# Patient Record
Sex: MALE | Race: WHITE | Employment: FULL TIME | URBAN - METROPOLITAN AREA
[De-identification: names, ages, dates, MRNs, and addresses within clinical notes are randomized per-mention and may not be internally consistent; named-entity substitution may affect disease eponyms.]

---

## 2017-01-04 ENCOUNTER — OFFICE VISIT (OUTPATIENT)
Dept: FAMILY MEDICINE CLINIC | Age: 27
End: 2017-01-04

## 2017-01-04 VITALS
HEART RATE: 92 BPM | TEMPERATURE: 98 F | HEIGHT: 75 IN | WEIGHT: 315 LBS | SYSTOLIC BLOOD PRESSURE: 120 MMHG | RESPIRATION RATE: 16 BRPM | BODY MASS INDEX: 39.17 KG/M2 | DIASTOLIC BLOOD PRESSURE: 70 MMHG

## 2017-01-04 DIAGNOSIS — S61.412D: ICD-10-CM

## 2017-01-04 DIAGNOSIS — J01.00 ACUTE NON-RECURRENT MAXILLARY SINUSITIS: Primary | ICD-10-CM

## 2017-01-04 PROCEDURE — 99213 OFFICE O/P EST LOW 20 MIN: CPT | Performed by: FAMILY MEDICINE

## 2017-01-04 RX ORDER — CEPHALEXIN 500 MG/1
500 CAPSULE ORAL 3 TIMES DAILY
Qty: 21 CAPSULE | Refills: 0 | Status: SHIPPED | OUTPATIENT
Start: 2017-01-04 | End: 2017-01-11

## 2017-01-04 ASSESSMENT — ENCOUNTER SYMPTOMS
COUGH: 0
NAUSEA: 0
ABDOMINAL PAIN: 0
SINUS PRESSURE: 1
SHORTNESS OF BREATH: 0
EYES NEGATIVE: 1
VOMITING: 1
SORE THROAT: 1
SINUS PAIN: 1
CONSTIPATION: 0
SWOLLEN GLANDS: 0
RHINORRHEA: 1
WHEEZING: 0
DIARRHEA: 0

## 2017-01-04 ASSESSMENT — PATIENT HEALTH QUESTIONNAIRE - PHQ9
SUM OF ALL RESPONSES TO PHQ QUESTIONS 1-9: 0
1. LITTLE INTEREST OR PLEASURE IN DOING THINGS: 0
SUM OF ALL RESPONSES TO PHQ9 QUESTIONS 1 & 2: 0
2. FEELING DOWN, DEPRESSED OR HOPELESS: 0

## 2017-01-06 ENCOUNTER — OFFICE VISIT (OUTPATIENT)
Dept: FAMILY MEDICINE CLINIC | Age: 27
End: 2017-01-06

## 2017-01-06 VITALS
RESPIRATION RATE: 20 BRPM | WEIGHT: 315 LBS | BODY MASS INDEX: 39.17 KG/M2 | HEIGHT: 75 IN | HEART RATE: 76 BPM | SYSTOLIC BLOOD PRESSURE: 136 MMHG | DIASTOLIC BLOOD PRESSURE: 88 MMHG | TEMPERATURE: 96.7 F

## 2017-01-06 DIAGNOSIS — S61.012S THUMB LACERATION, LEFT, SEQUELA: Primary | ICD-10-CM

## 2017-01-06 PROCEDURE — 99213 OFFICE O/P EST LOW 20 MIN: CPT | Performed by: FAMILY MEDICINE

## 2017-01-06 ASSESSMENT — ENCOUNTER SYMPTOMS
SHORTNESS OF BREATH: 0
DIARRHEA: 0
CONSTIPATION: 0
ABDOMINAL PAIN: 0
NAUSEA: 0
COUGH: 0
EYES NEGATIVE: 1

## 2018-09-10 ENCOUNTER — OFFICE VISIT (OUTPATIENT)
Dept: FAMILY MEDICINE CLINIC | Age: 28
End: 2018-09-10
Payer: COMMERCIAL

## 2018-09-10 VITALS
BODY MASS INDEX: 39.17 KG/M2 | DIASTOLIC BLOOD PRESSURE: 88 MMHG | WEIGHT: 315 LBS | TEMPERATURE: 96.9 F | SYSTOLIC BLOOD PRESSURE: 126 MMHG | RESPIRATION RATE: 12 BRPM | HEART RATE: 92 BPM | HEIGHT: 75 IN

## 2018-09-10 DIAGNOSIS — H92.02 LEFT EAR PAIN: Primary | ICD-10-CM

## 2018-09-10 DIAGNOSIS — H66.92 LEFT OTITIS MEDIA, UNSPECIFIED OTITIS MEDIA TYPE: ICD-10-CM

## 2018-09-10 PROCEDURE — 99213 OFFICE O/P EST LOW 20 MIN: CPT | Performed by: FAMILY MEDICINE

## 2018-09-10 RX ORDER — LEVOFLOXACIN 500 MG/1
500 TABLET, FILM COATED ORAL DAILY
Qty: 7 TABLET | Refills: 0 | Status: SHIPPED | OUTPATIENT
Start: 2018-09-10 | End: 2018-09-17

## 2018-09-10 RX ORDER — NAPROXEN 500 MG/1
500 TABLET ORAL 2 TIMES DAILY WITH MEALS
Qty: 60 TABLET | Refills: 5 | Status: SHIPPED | OUTPATIENT
Start: 2018-09-10 | End: 2018-09-10 | Stop reason: SDUPTHER

## 2018-09-10 RX ORDER — NAPROXEN 500 MG/1
500 TABLET ORAL 2 TIMES DAILY WITH MEALS
Qty: 60 TABLET | Refills: 5 | Status: SHIPPED | OUTPATIENT
Start: 2018-09-10

## 2018-09-10 ASSESSMENT — PATIENT HEALTH QUESTIONNAIRE - PHQ9
SUM OF ALL RESPONSES TO PHQ9 QUESTIONS 1 & 2: 0
1. LITTLE INTEREST OR PLEASURE IN DOING THINGS: 0
SUM OF ALL RESPONSES TO PHQ QUESTIONS 1-9: 0
SUM OF ALL RESPONSES TO PHQ QUESTIONS 1-9: 0
2. FEELING DOWN, DEPRESSED OR HOPELESS: 0

## 2018-09-10 NOTE — PROGRESS NOTES
and Subjective:      Patient ID: Cristino Gottron is a 28 y.o. male. Chief Complaint   Patient presents with    Otalgia     left ear x 1 week. Other sympotms include plugged ear sensation, and drianage. Reports symptoms are worsening. Has tried ear candles, tylenol, ear drops, rubbing alcohol, and peroxide with no relief. HPI  Here today for left ear pain off last week also feels like a plugged sensation to be worsening has tried ear candles in the past and Tylenol relief has been doing a lot swimming also no fevers no chills      Eating drinking well             No Known Allergies  Outpatient Encounter Prescriptions as of 9/10/2018   Medication Sig Dispense Refill    naproxen (NAPROSYN) 500 MG tablet Take 1 tablet by mouth 2 times daily (with meals) 60 tablet 3     No facility-administered encounter medications on file as of 9/10/2018. Social History     Social History    Marital status: Single     Spouse name: N/A    Number of children: N/A    Years of education: N/A     Occupational History    Not on file. Social History Main Topics    Smoking status: Former Smoker     Years: 10.00     Types: Cigarettes     Quit date: 4/21/2018    Smokeless tobacco: Former User    Alcohol use 0.0 oz/week    Drug use: No    Sexual activity: Yes     Partners: Female      Comment: single     Other Topics Concern    Not on file     Social History Narrative    No narrative on file     Family History   Problem Relation Age of Onset    Thyroid Disease Mother      Past Medical History:   Diagnosis Date    ADHD (attention deficit hyperactivity disorder)     Allergic rhinitis      Past Surgical History:   Procedure Laterality Date    CARPAL TUNNEL RELEASE Left 08/15/2016    DR. BOCANEGRA    CARPAL TUNNEL RELEASE Right 08/29/2016    NEMO BOCANEGRA MD         REVIEW OF SYSTEMS:   Patient seen today for exam.  Denies any problems with hearing, headaches or vision.   Denies any shortness of breath, chest pain, nausea or planned,

## 2019-03-08 ENCOUNTER — TELEPHONE (OUTPATIENT)
Dept: FAMILY MEDICINE CLINIC | Age: 29
End: 2019-03-08